# Patient Record
Sex: FEMALE | Race: NATIVE HAWAIIAN OR OTHER PACIFIC ISLANDER | NOT HISPANIC OR LATINO | URBAN - METROPOLITAN AREA
[De-identification: names, ages, dates, MRNs, and addresses within clinical notes are randomized per-mention and may not be internally consistent; named-entity substitution may affect disease eponyms.]

---

## 2017-08-23 ENCOUNTER — EMERGENCY (EMERGENCY)
Facility: HOSPITAL | Age: 47
LOS: 1 days | Discharge: HOME | End: 2017-08-23
Attending: EMERGENCY MEDICINE | Admitting: EMERGENCY MEDICINE
Payer: COMMERCIAL

## 2017-08-23 VITALS
OXYGEN SATURATION: 98 % | TEMPERATURE: 98 F | HEIGHT: 65.35 IN | SYSTOLIC BLOOD PRESSURE: 117 MMHG | WEIGHT: 121.25 LBS | DIASTOLIC BLOOD PRESSURE: 71 MMHG | RESPIRATION RATE: 18 BRPM | HEART RATE: 74 BPM
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
## 2017-08-23 DIAGNOSIS — S52.501A UNSPECIFIED FRACTURE OF THE LOWER END OF RIGHT RADIUS, INITIAL ENCOUNTER FOR CLOSED FRACTURE: ICD-10-CM

## 2017-08-23 DIAGNOSIS — S52.132A DISPLACED FRACTURE OF NECK OF LEFT RADIUS, INITIAL ENCOUNTER FOR CLOSED FRACTURE: ICD-10-CM

## 2017-08-23 DIAGNOSIS — S70.311A ABRASION, RIGHT THIGH, INITIAL ENCOUNTER: ICD-10-CM

## 2017-08-23 DIAGNOSIS — S69.92XA UNSPECIFIED INJURY OF LEFT WRIST, HAND AND FINGER(S), INITIAL ENCOUNTER: ICD-10-CM
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
## 2017-08-23 DIAGNOSIS — Z88.8 ALLERGY STATUS TO OTHER DRUGS, MEDICAMENTS AND BIOLOGICAL SUBSTANCES: ICD-10-CM

## 2017-08-23 DIAGNOSIS — S00.212A ABRASION OF LEFT EYELID AND PERIOCULAR AREA, INITIAL ENCOUNTER: ICD-10-CM

## 2017-08-23 PROCEDURE — 25600 CLTX DST RDL FX/EPHYS SEP WO: CPT | Mod: LT

## 2017-08-23 PROCEDURE — 24650 CLTX RDL HEAD/NCK FX WO MNPJ: CPT | Mod: RT

## 2017-08-23 PROCEDURE — 73080 X-RAY EXAM OF ELBOW: CPT

## 2017-08-23 PROCEDURE — 99284 EMERGENCY DEPT VISIT MOD MDM: CPT | Mod: 25

## 2017-08-23 PROCEDURE — 73110 X-RAY EXAM OF WRIST: CPT

## 2017-08-23 PROCEDURE — 73080 X-RAY EXAM OF ELBOW: CPT | Mod: 26,LT

## 2017-08-23 PROCEDURE — 73110 X-RAY EXAM OF WRIST: CPT | Mod: 26,50
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
## 2017-08-23 NOTE — ED PROVIDER NOTE - MUSCULOSKELETAL, MLM
no head, neck, or spinal pain. FROM of rt elbow and rt wrist, reproducible pain in left elbow and point tenderness to b/l wrist no head, neck, or spinal pain. FROM of rt elbow and  limited rom of wrist, reproducible pain in left elbow and point tenderness to b/l wrist
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
## 2017-08-23 NOTE — ED PROVIDER NOTE - SKIN, MLM
swelling to left wrist, ecchymosis  to rt wrist, multiple upper extremity abrasions, abrasion the medial rt thigh, abrasion above left eyebrow

## 2023-12-08 NOTE — ED PROVIDER NOTE - DISPOSITION TYPE
Called pt back twice--no answer and unable to leave voicemail  
I spoke to pt and told her that her hgb is 7.4 and was 7.8 previously.    With regard to her urine, I told her that she needs to f/u with the prescriber who ordered her urine to be tested and she verbalized understanding  
Patient called for lab and urine results    [phone]   
DISCHARGE